# Patient Record
Sex: MALE | Race: WHITE | NOT HISPANIC OR LATINO | Employment: FULL TIME | URBAN - METROPOLITAN AREA
[De-identification: names, ages, dates, MRNs, and addresses within clinical notes are randomized per-mention and may not be internally consistent; named-entity substitution may affect disease eponyms.]

---

## 2022-03-01 ENCOUNTER — TRANSCRIBE ORDERS (OUTPATIENT)
Dept: ADMINISTRATIVE | Facility: HOSPITAL | Age: 57
End: 2022-03-01

## 2022-03-01 DIAGNOSIS — R07.9 CHEST PAIN, UNSPECIFIED TYPE: Primary | ICD-10-CM

## 2022-03-01 DIAGNOSIS — R06.02 SHORTNESS OF BREATH: ICD-10-CM

## 2022-03-10 ENCOUNTER — HOSPITAL ENCOUNTER (OUTPATIENT)
Dept: RESPIRATORY THERAPY | Facility: HOSPITAL | Age: 57
Discharge: HOME | End: 2022-03-10
Admitting: FAMILY MEDICINE

## 2022-03-10 DIAGNOSIS — R06.02 SHORTNESS OF BREATH: ICD-10-CM

## 2022-03-10 DIAGNOSIS — R07.9 CHEST PAIN, UNSPECIFIED TYPE: ICD-10-CM

## 2022-03-10 PROCEDURE — 93018 CV STRESS TEST I&R ONLY: CPT | Performed by: INTERNAL MEDICINE

## 2022-03-10 PROCEDURE — 93017 CV STRESS TEST TRACING ONLY: CPT

## 2022-03-13 LAB
BH CV STRESS BP STAGE 1: NORMAL
BH CV STRESS BP STAGE 2: NORMAL
BH CV STRESS DURATION MIN STAGE 1: 3
BH CV STRESS DURATION MIN STAGE 2: 3
BH CV STRESS DURATION MIN STAGE 3: 3
BH CV STRESS DURATION MIN STAGE 4: 2
BH CV STRESS DURATION SEC STAGE 1: 0
BH CV STRESS DURATION SEC STAGE 2: 0
BH CV STRESS DURATION SEC STAGE 3: 0
BH CV STRESS DURATION SEC STAGE 4: 1
BH CV STRESS GRADE STAGE 1: 10
BH CV STRESS GRADE STAGE 2: 12
BH CV STRESS GRADE STAGE 3: 14
BH CV STRESS GRADE STAGE 4: 16
BH CV STRESS HR STAGE 1: 115
BH CV STRESS HR STAGE 2: 135
BH CV STRESS HR STAGE 3: 146
BH CV STRESS HR STAGE 4: 158
BH CV STRESS METS STAGE 1: 5
BH CV STRESS METS STAGE 2: 7.5
BH CV STRESS METS STAGE 3: 10
BH CV STRESS METS STAGE 4: 13.5
BH CV STRESS PROTOCOL 1: NORMAL
BH CV STRESS RECOVERY BP: NORMAL MMHG
BH CV STRESS RECOVERY HR: 96 BPM
BH CV STRESS SPEED STAGE 1: 1.7
BH CV STRESS SPEED STAGE 2: 2.5
BH CV STRESS SPEED STAGE 3: 3.4
BH CV STRESS SPEED STAGE 4: 4.2
BH CV STRESS STAGE 1: 1
BH CV STRESS STAGE 2: 2
BH CV STRESS STAGE 3: 3
BH CV STRESS STAGE 4: 4
MAXIMAL PREDICTED HEART RATE: 165 BPM
PERCENT MAX PREDICTED HR: 96.36 %
STRESS BASELINE BP: NORMAL MMHG
STRESS BASELINE HR: 95 BPM
STRESS PERCENT HR: 113 %
STRESS POST ESTIMATED WORKLOAD: 12.8 METS
STRESS POST EXERCISE DUR MIN: 11 MIN
STRESS POST EXERCISE DUR SEC: 1 SEC
STRESS POST PEAK BP: NORMAL MMHG
STRESS POST PEAK HR: 159 BPM
STRESS TARGET HR: 140 BPM

## 2023-11-30 ENCOUNTER — OFFICE VISIT (OUTPATIENT)
Dept: ORTHOPEDIC SURGERY | Facility: CLINIC | Age: 58
End: 2023-11-30
Payer: COMMERCIAL

## 2023-11-30 VITALS — WEIGHT: 180 LBS | HEIGHT: 74 IN | BODY MASS INDEX: 23.1 KG/M2 | OXYGEN SATURATION: 96 % | HEART RATE: 78 BPM

## 2023-11-30 DIAGNOSIS — M62.9: ICD-10-CM

## 2023-11-30 DIAGNOSIS — M25.511 ACUTE PAIN OF RIGHT SHOULDER: Primary | ICD-10-CM

## 2023-11-30 DIAGNOSIS — M75.41 SUBACROMIAL IMPINGEMENT OF RIGHT SHOULDER: ICD-10-CM

## 2023-11-30 RX ORDER — LEVOTHYROXINE SODIUM 0.2 MG/1
200 TABLET ORAL DAILY
COMMUNITY
Start: 2023-11-22

## 2023-11-30 RX ORDER — LIDOCAINE HYDROCHLORIDE 10 MG/ML
4 INJECTION, SOLUTION EPIDURAL; INFILTRATION; INTRACAUDAL; PERINEURAL
Status: COMPLETED | OUTPATIENT
Start: 2023-11-30 | End: 2023-11-30

## 2023-11-30 RX ORDER — TRIAMCINOLONE ACETONIDE 40 MG/ML
40 INJECTION, SUSPENSION INTRA-ARTICULAR; INTRAMUSCULAR
Status: COMPLETED | OUTPATIENT
Start: 2023-11-30 | End: 2023-11-30

## 2023-11-30 RX ADMIN — LIDOCAINE HYDROCHLORIDE 4 ML: 10 INJECTION, SOLUTION EPIDURAL; INFILTRATION; INTRACAUDAL; PERINEURAL at 09:03

## 2023-11-30 RX ADMIN — TRIAMCINOLONE ACETONIDE 40 MG: 40 INJECTION, SUSPENSION INTRA-ARTICULAR; INTRAMUSCULAR at 09:03

## 2023-11-30 NOTE — PROGRESS NOTES
"NEW VISIT    Patient: Tobi Vega  ?  YOB: 1965    MRN: [id number]  ?  Chief Complaint   Patient presents with    Right Shoulder - Initial Evaluation      ?  HPI: Patient is a 58-year-old male presents today for chronic right shoulder pain.  He denies acute injury but states has had a gradual onset of dull achy pain with sharp episodes over the superior and lateral aspect of the right shoulder.  Pain is significantly worse with internal rotation and attempting to reach behind his back.  Minimal issue with overhead activity.  Denies weakness compared with contralateral shoulder.  Denies any prior shoulder injuries or surgeries.  Denies numbness or tingling.  No current treatment at this time.  Denies any prior injections, physical therapy or oral medications    Allergies:   Allergies   Allergen Reactions    Sulfa Antibiotics Hives       Past Medical History:   Diagnosis Date    Thyroid disease      Past Surgical History:   Procedure Laterality Date    APPENDECTOMY  1970     Social History     Occupational History    Not on file   Tobacco Use    Smoking status: Every Day     Types: Cigars    Smokeless tobacco: Never   Substance and Sexual Activity    Alcohol use: Yes     Comment: 1-2 beers a night    Drug use: Never    Sexual activity: Defer      Social History     Social History Narrative    Not on file     History reviewed. No pertinent family history.    Review of Systems  Constitutional: Negative.  Negative for fever.   Musculoskeletal: Positive for joint pain  Skin: Negative.  Negative for rash and wound.    Neurological: Negative for numbness.     Vitals:    11/30/23 0837   Pulse: 78   SpO2: 96%   Weight: 81.6 kg (180 lb)   Height: 188 cm (74\")        Physical Exam  Constitutional: Patient is oriented to person, place, and time. Appears well-developed and well-nourished.   Head: Normocephalic and atraumatic.   Pulmonary/Chest: Effort normal.   Musculoskeletal:   See detailed exam below "   Neurological: Alert and oriented to person, place, and time. No sensory deficit. Coordination normal.   Skin: Skin is warm and dry. Capillary refill takes less than 2 seconds. No rash noted. No erythema.     The right shoulder is without obvious signs of acute bony deformity, swelling, erythema or ecchymosis.  There is tenderness laterally over the subacromial bursa and rotator cuff insertion.  There is no tenderness along the joint line. There is no tenderness at the AC joint. There is no tenderness at the SC joint. Active range of motion is limited, with painful arc.  Passive range of motion is limited,  with painful arc.  Internal rotation limited to posterior hip with discomfort.  Impingement signs are positive with Neer, Cardenas, and crossover tests. Instability tests are negative with anterior and posterior drawer, and sulcus test. Speeds and Yergason's tests are negative. Sterling's test is positive for pain. Reynaldo's test is positive for pain.  3-/5 with subscapularis strength testing and significant pain compared with contralateral side.  Other rotator cuff strength is 5/5 and uncomfortable. Scapular function is normal.  The neck and opposite shoulder are otherwise normal and stable.       Diagnostics:  xrays obtained today     Right Shoulder X-Ray  Indication: Pain  Views: AP Internal and External Rotation    Findings:  No fracture  No bony lesion  Normal soft tissues  Minimal degenerative change of the glenohumeral joint with subchondral sclerosis of the glenoid.       Assessment:  Diagnoses and all orders for this visit:    1. Acute pain of right shoulder (Primary)  -     XR Shoulder 2+ View Right  -     Large Joint Arthrocentesis: R subacromial bursa    2. Subacromial impingement of right shoulder  -     Large Joint Arthrocentesis: R subacromial bursa    3. Subscapularis insufficiency        Plan    Above diagnosis and plan discussed the patient office today.  X-rays obtained and negative for acute  osseous abnormality there is mild degenerative change at the glenohumeral joint.  On exam today he has positive impingement signs, and is most symptomatic with subscapularis testing and internal rotation.  Noted weakness with this compared with contralateral side.  We discussed conservative options and he elected to proceed with subacromial corticosteroid injection which was given as noted below without complication.  He will also start regular Aleve for the next 1 to 2 weeks and as needed afterwards.  Encouraged regular ice 2-3 times a day.  We also discussed formal physical therapy but patient prefers home exercises which were given to him on print out in office today.  Plan 4-month follow-up to monitor progress with home exercises and corticosteroid injection.  If he still has significant discomfort or weakness with subscapularis testing will consider MRI to further evaluate rotator cuff  Physical Therapy discussed   Activity modifications discussed and recommended  Rest, ice, compression, and elevation (RICE) therapy  Alternate OTC Ibuprofen and Tylenol as needed/if clinically indicated  Follow up in 4 month(s)    Large Joint Arthrocentesis: R subacromial bursa  Date/Time: 11/30/2023 9:03 AM  Consent given by: patient  Site marked: site marked  Timeout: Immediately prior to procedure a time out was called to verify the correct patient, procedure, equipment, support staff and site/side marked as required   Supporting Documentation  Indications: pain   Procedure Details  Location: shoulder - R subacromial bursa  Preparation: Patient was prepped and draped in the usual sterile fashion  Needle size: 25 G  Approach: posterior  Medications administered: 4 mL lidocaine PF 1% 1 %; 40 mg triamcinolone acetonide 40 MG/ML  Patient tolerance: patient tolerated the procedure well with no immediate complications        Date of encounter: 11/30/2023   Maximo Watson DO    Electronically signed by Maximo Watson DO, 11/30/23, 8:50  AM EST.    Disclaimer: Please note that areas of this note were completed with computer voice recognition software.  Quite often unanticipated grammatical, syntax, homophones, and other interpretive errors are inadvertently transcribed by the computer software. Please excuse any errors that have escaped final proofreading.